# Patient Record
Sex: MALE | Race: ASIAN | NOT HISPANIC OR LATINO | URBAN - METROPOLITAN AREA
[De-identification: names, ages, dates, MRNs, and addresses within clinical notes are randomized per-mention and may not be internally consistent; named-entity substitution may affect disease eponyms.]

---

## 2018-03-31 ENCOUNTER — EMERGENCY (EMERGENCY)
Facility: HOSPITAL | Age: 53
LOS: 1 days | Discharge: HOME | End: 2018-03-31
Attending: EMERGENCY MEDICINE | Admitting: EMERGENCY MEDICINE
Payer: MEDICAID

## 2018-03-31 VITALS
HEART RATE: 78 BPM | TEMPERATURE: 99 F | OXYGEN SATURATION: 100 % | SYSTOLIC BLOOD PRESSURE: 151 MMHG | DIASTOLIC BLOOD PRESSURE: 102 MMHG | RESPIRATION RATE: 16 BRPM

## 2018-03-31 VITALS
SYSTOLIC BLOOD PRESSURE: 145 MMHG | DIASTOLIC BLOOD PRESSURE: 104 MMHG | RESPIRATION RATE: 18 BRPM | TEMPERATURE: 98 F | OXYGEN SATURATION: 100 % | HEART RATE: 89 BPM

## 2018-03-31 PROCEDURE — 99053 MED SERV 10PM-8AM 24 HR FAC: CPT

## 2018-03-31 PROCEDURE — 99282 EMERGENCY DEPT VISIT SF MDM: CPT | Mod: 25
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
## 2018-03-31 NOTE — ED ADULT NURSE NOTE - OBJECTIVE STATEMENT
Pt received to ED Rm 25 sp ETOH intox - admits to "drinking a few beers a few hours ago." Pt calm/cooperative, no slurring noted, steady gait to bathroom noted. Pt denies abd pain, N/V, HA, or any other adverse symptom at this time. Admits to sometimes having "family problems at home with brother but nothing big." RR equal & unlabored. Awaiting MD assessment/orders/dispo. Will monitor. Pt received to ED Rm 25 sp ETOH intox - admits to "drinking a few beers a few hours ago." Pt calm/cooperative, no slurring noted, steady gait to bathroom noted. Pt denies abd pain, N/V, HA, or any other adverse symptom at this time. Admits to sometimes having "family problems at home with brother but nothing big." States hx of HTN & "only takes meds after checking BP in the AM & is above 130/90." RR equal & unlabored. Awaiting MD assessment/orders/dispo. Will monitor.